# Patient Record
Sex: MALE | Race: WHITE | NOT HISPANIC OR LATINO | Employment: FULL TIME | URBAN - METROPOLITAN AREA
[De-identification: names, ages, dates, MRNs, and addresses within clinical notes are randomized per-mention and may not be internally consistent; named-entity substitution may affect disease eponyms.]

---

## 2024-01-15 ENCOUNTER — HOSPITAL ENCOUNTER (EMERGENCY)
Facility: HOSPITAL | Age: 64
Discharge: LEFT BEFORE DISCHARGE | End: 2024-01-15
Payer: COMMERCIAL

## 2024-01-15 ENCOUNTER — HOSPITAL ENCOUNTER (EMERGENCY)
Facility: HOSPITAL | Age: 64
Discharge: HOME | End: 2024-01-15
Attending: EMERGENCY MEDICINE | Admitting: EMERGENCY MEDICINE
Payer: COMMERCIAL

## 2024-01-15 VITALS
RESPIRATION RATE: 16 BRPM | HEART RATE: 62 BPM | DIASTOLIC BLOOD PRESSURE: 69 MMHG | TEMPERATURE: 98 F | SYSTOLIC BLOOD PRESSURE: 144 MMHG | OXYGEN SATURATION: 97 %

## 2024-01-15 DIAGNOSIS — R33.8 ACUTE URINARY RETENTION: ICD-10-CM

## 2024-01-15 LAB
ALBUMIN UR-MCNC: NEGATIVE MG/DL
APPEARANCE UR: CLEAR
BILIRUB UR QL STRIP: NEGATIVE
COLOR UR AUTO: ABNORMAL
GLUCOSE UR STRIP-MCNC: NEGATIVE MG/DL
HGB UR QL STRIP: ABNORMAL
HYALINE CASTS: 1 /LPF
KETONES UR STRIP-MCNC: ABNORMAL MG/DL
LEUKOCYTE ESTERASE UR QL STRIP: NEGATIVE
MUCOUS THREADS #/AREA URNS LPF: PRESENT /LPF
NITRATE UR QL: NEGATIVE
PH UR STRIP: 5 [PH] (ref 5–7)
RBC URINE: 64 /HPF
SP GR UR STRIP: 1.01 (ref 1–1.03)
UROBILINOGEN UR STRIP-MCNC: <2 MG/DL
WBC URINE: 4 /HPF

## 2024-01-15 PROCEDURE — 51798 US URINE CAPACITY MEASURE: CPT

## 2024-01-15 PROCEDURE — 99284 EMERGENCY DEPT VISIT MOD MDM: CPT

## 2024-01-15 PROCEDURE — 51702 INSERT TEMP BLADDER CATH: CPT

## 2024-01-15 PROCEDURE — 99284 EMERGENCY DEPT VISIT MOD MDM: CPT | Mod: 25

## 2024-01-15 PROCEDURE — 250N000009 HC RX 250: Performed by: EMERGENCY MEDICINE

## 2024-01-15 PROCEDURE — 81001 URINALYSIS AUTO W/SCOPE: CPT | Performed by: EMERGENCY MEDICINE

## 2024-01-15 RX ORDER — LIDOCAINE HYDROCHLORIDE 20 MG/ML
10 JELLY TOPICAL ONCE
Status: COMPLETED | OUTPATIENT
Start: 2024-01-15 | End: 2024-01-15

## 2024-01-15 RX ORDER — CIPROFLOXACIN 500 MG/1
500 TABLET, FILM COATED ORAL 2 TIMES DAILY
Qty: 14 TABLET | Refills: 0 | Status: SHIPPED | OUTPATIENT
Start: 2024-01-15 | End: 2024-01-22

## 2024-01-15 RX ADMIN — LIDOCAINE HYDROCHLORIDE 10 ML: 20 JELLY TOPICAL at 05:11

## 2024-01-15 ASSESSMENT — ACTIVITIES OF DAILY LIVING (ADL)
ADLS_ACUITY_SCORE: 33
ADLS_ACUITY_SCORE: 35

## 2024-01-15 NOTE — ED TRIAGE NOTES
"Patient seen earlier in ED and had catheter placement and has returned due to having issue with catheter \"leaking.\" Shortly into triage RN found that patient's catheter leaking due to catheter being open. Educated patient on catheter opening to draining and closing. Patient decided that he no longer wants to be seen for this issue.         "

## 2024-01-15 NOTE — Clinical Note
Dinh Mike was seen and treated in our emergency department on 1/15/2024.         Sincerely,     [facility]

## 2024-01-15 NOTE — ED TRIAGE NOTES
Pt reports 24 hours of having difficulty urinating. He states that he frequently urinates a small amount but does not feel like he is actually emptying his bladder. He reports incontinent as well.   He reports abdominal pain.

## 2024-01-15 NOTE — DISCHARGE INSTRUCTIONS
Call your regular doctor tomorrow to let them know about your ER visit and the need for Gilbert catheter placement.  Otherwise I did place a referral for you to see urology.  That appointment should be within the next 1 to 2 weeks.  In the meantime take the antibiotic to prevent infection.    You should receive a call from our schedulers within the next business day to set up the appointment with urology.    I do also recommend that you check with your insurance to find out which urology group is within your network so that you can choose the best clinic for you so that you pay the least out-of-pocket.  If you decide not to use [facility] there is also [facility] urology in town.

## 2024-01-15 NOTE — Clinical Note
Dinh Rashid was seen and treated in our emergency department on 1/15/2024.  He may return to work on 01/16/2024.       If you have any questions or concerns, please don't hesitate to call.      Shanthi Nicholson MD

## 2024-01-15 NOTE — ED PROVIDER NOTES
Emergency Department Encounter     Evaluation Date & Time:   1/15/2024  4:42 AM    CHIEF COMPLAINT:  Urinary Retention      Triage Note:Pt reports 24 hours of having difficulty urinating. He states that he frequently urinates a small amount but does not feel like he is actually emptying his bladder. He reports incontinent as well.   He reports abdominal pain.           FINAL IMPRESSION:    ICD-10-CM    1. Acute urinary retention  R33.8 Adult Urology  Referral          Impression and Plan     ED COURSE & MEDICAL DECISION MAKIN:56 AM I met with the patient to obtain patient history and performed a physical exam. Discussed plan for ED work up including potential diagnostic studies and interventions.    ED Course as of 01/15/24 0734   Mon Aren 15, 2024   0636 Patient presented with symptoms of urinary retention.  Differential includes prostate enlargement whether from the cancer, infection, or benign.  Also may be due to prostatitis or UTI.  If there was no urinary retention then would think bladder spasm likely from UTI.  If there was no retention then would also consider the possibility of a kidney stone.  However, nursing staff did do a bedside ultrasound after he tried to urinate but was unable to and there was over 600 cc of urine in his bladder.  Gilbert catheter was placed and symptoms improved.  Urinalysis shows no evidence of infection.  Will have him follow-up with urology for further cares and evaluation.  Referral sent.   0706 He had 1400 cc of dark yellow urine in his bladder.  He feels much better now that it is drained.  There is some blood in it.  Will place on antibiotic and have him follow-up with urology.  He would like a referral to urology and he will notify his [facility] ([place], [place] family physicians of the plan to follow-up with urology       At the conclusion of the encounter I discussed the results of all the tests and the disposition. The questions were answered. The patient or family  acknowledged understanding and was agreeable with the care plan.          0 minutes of critical care time        MEDICATIONS GIVEN IN THE EMERGENCY DEPARTMENT:  Medications   lidocaine (XYLOCAINE) 2 % external gel 10 mL (10 mLs Urethral $Given 1/15/24 0551)       NEW PRESCRIPTIONS STARTED AT TODAY'S ED VISIT:  New Prescriptions    CIPROFLOXACIN (CIPRO) 500 MG TABLET    Take 1 tablet (500 mg) by mouth 2 times daily for 7 days       HPI     HPI     Dinh Mike is a 63 year old male with a pertinent history of HLD who presents to this ED via self walk-in for evaluation of urinary retention.    Patient reports persistent urinary retention since yesterday. He states that he feels the urge to urinate but has been unable to. The pain goes into his back. He denies history of UTI or enlarged prostate. He denies fever. There were no other concerns/complaints at this time.    REVIEW OF SYSTEMS:  Review of Systems  remainder of systems are all otherwise negative.        Medical History     History reviewed. No pertinent past medical history.    History reviewed. No pertinent surgical history.    History reviewed. No pertinent family history.    Social History     Tobacco Use    Smoking status: Never    Smokeless tobacco: Current     Types: Chew   Substance Use Topics    Alcohol use: No    Drug use: No       ciprofloxacin (CIPRO) 500 MG tablet  SIMVASTATIN PO        Physical Exam     First Vitals:  Patient Vitals for the past 24 hrs:   BP Temp Temp src Pulse Resp SpO2   01/15/24 0441 (!) 183/118 -- -- -- -- --   01/15/24 0439 -- 98  F (36.7  C) Oral 86 16 96 %       PHYSICAL EXAM:   Constitutional:  Sitting upright and is conversable.   HENT:  Normocephalic, posterior pharynx wnl, mucous membranes pink and moist   Eyes:  PERRL, EOMI, Conjunctiva normal, No discharge, no scleral icterus.  Respiratory:  Breathing easily, clear to auscultation.  Cardiovascular:  Regular rate and rhythm. nl s1s2 0 murmurs, rubs, or gallops.   Peripheral pulses dp, pt, and radial are wnl.  No peripheral edema   GI:  Bowel sounds normal, Soft, No tenderness, No flank tenderness, nondistended.  : Gilbert catheter placed by nursing staff is draining dark yellow urine total of 1400 cc and his suprapubic area now is soft and nontender.  Musculoskeletal:  Moves all extremities.  No erythematous or swollen major joints,   Lymphatic:  No cervical lymphadenopathy  Neurologic:  Alert & oriented x 3, Normal motor function, Normal sensory function, No focal deficits noted. Normal speech.  Psychiatric:  Affect normal, Judgment normal, Mood normal.     Results     LAB AND RADIOLOGY:  All pertinent labs reviewed and interpreted  Results for orders placed or performed during the hospital encounter of 01/15/24   UA with Microscopic reflex to Culture     Status: Abnormal    Specimen: Urine, Catheter   Result Value Ref Range    Color Urine Light Yellow Colorless, Straw, Light Yellow, Yellow    Appearance Urine Clear Clear    Glucose Urine Negative Negative mg/dL    Bilirubin Urine Negative Negative    Ketones Urine Trace (A) Negative mg/dL    Specific Gravity Urine 1.015 1.001 - 1.030    Blood Urine 0.2 mg/dL (A) Negative    pH Urine 5.0 5.0 - 7.0    Protein Albumin Urine Negative Negative mg/dL    Urobilinogen Urine <2.0 <2.0 mg/dL    Nitrite Urine Negative Negative    Leukocyte Esterase Urine Negative Negative    Mucus Urine Present (A) None Seen /LPF    RBC Urine 64 (H) <=2 /HPF    WBC Urine 4 <=5 /HPF    Hyaline Casts Urine 1 <=2 /LPF    Narrative    Urine Culture not indicated         ECG:  None    PROCEDURES:  Procedures:      [facility] Documentation     Medical Decision Making  Obtained supplemental history:Supplemental history obtained?: No  Reviewed external records: External records reviewed?: No  Care impacted by chronic illness:Hyperlipidemia  Care significantly affected by social determinants of health:Access to Medical Care  Did you consider but not  order tests?: Work up considered but not performed and documented in chart, if applicable  Did you interpret images independently?: Independent interpretation of ECG and images noted in documentation, when applicable.  Consultation discussion with other provider:Did you involve another provider (consultant, , pharmacy, etc.)?: No  Discharge. I prescribed additional prescription strength medication(s) as charted. See documentation for any additional details.       The creation of this record is based on the scribe s observations of the work being performed by Melodie Wilks and the provider s statements to them. This document has been checked and approved by MD Shanthi Vásquez MD  Emergency Medicine  [facility] EMERGENCY DEPARTMENT         Shanthi Nicholson MD  01/15/24 0734

## 2024-01-23 ENCOUNTER — OFFICE VISIT (OUTPATIENT)
Dept: UROLOGY | Facility: CLINIC | Age: 64
End: 2024-01-23
Payer: COMMERCIAL

## 2024-01-23 ENCOUNTER — ALLIED HEALTH/NURSE VISIT (OUTPATIENT)
Dept: UROLOGY | Facility: CLINIC | Age: 64
End: 2024-01-23
Payer: COMMERCIAL

## 2024-01-23 VITALS
HEART RATE: 65 BPM | SYSTOLIC BLOOD PRESSURE: 142 MMHG | WEIGHT: 240 LBS | BODY MASS INDEX: 34.19 KG/M2 | RESPIRATION RATE: 18 BRPM | DIASTOLIC BLOOD PRESSURE: 82 MMHG | OXYGEN SATURATION: 98 %

## 2024-01-23 DIAGNOSIS — R33.9 URINARY RETENTION WITH INCOMPLETE BLADDER EMPTYING: Primary | ICD-10-CM

## 2024-01-23 DIAGNOSIS — R33.8 BENIGN PROSTATIC HYPERPLASIA WITH URINARY RETENTION: ICD-10-CM

## 2024-01-23 DIAGNOSIS — N40.1 BENIGN PROSTATIC HYPERPLASIA WITH URINARY RETENTION: ICD-10-CM

## 2024-01-23 PROCEDURE — 99203 OFFICE O/P NEW LOW 30 MIN: CPT | Performed by: STUDENT IN AN ORGANIZED HEALTH CARE EDUCATION/TRAINING PROGRAM

## 2024-01-23 PROCEDURE — 99207 PR NO CHARGE NURSE ONLY: CPT

## 2024-01-23 RX ORDER — TAMSULOSIN HYDROCHLORIDE 0.4 MG/1
0.4 CAPSULE ORAL EVERY EVENING
Qty: 90 CAPSULE | Refills: 1 | Status: SHIPPED | OUTPATIENT
Start: 2024-01-23 | End: 2024-02-26

## 2024-01-23 NOTE — PROGRESS NOTES
Chief Complaint:   Urinary retention         History of Present Illness:   Dinh Mike is a 63 year old male with a history of HLD who presents for evaluation of urinary retention.     The patient presented to the ED on 1/15/2024 for difficulty urinating. An indwelling catheter was placed and 1400 mL of urine was drained from the bladder.     His PSA was 4.8 ng/mL on 8/21/2023.          Past Medical History:   No past medical history on file.         Past Surgical History:   No past surgical history on file.         Medications     Current Outpatient Medications   Medication    SIMVASTATIN PO     No current facility-administered medications for this visit.            Allergies:   Penicillins         Review of Systems:  From intake questionnaire   Negative 14 system review except as noted on HPI, nurse's note.         Physical Exam:   Patient is a 63 year old  male   Vitals: There were no vitals taken for this visit.  General Appearance Adult: Alert, no acute distress, oriented.  Lungs: Non-labored breathing.  Heart: No obvious jugular venous distension present.  Neuro: Alert, oriented, speech and mentation normal      Labs and Pathology:    I personally reviewed all applicable laboratory data and went over findings with patient  Significant for:    UA RESULTS:   Recent Labs   Lab Test 01/15/24  0528   SG 1.015   URINEPH 5.0   NITRITE Negative   RBCU 64*   WBCU 4            Assessment and Plan:     Assessment: 63 year old male seen in evaluation for urinary retention. The patient presented to the ED on 1/15/2024 for difficulty urinating. An indwelling catheter was placed and 1400 mL of urine was drained from the bladder.     A TOV was performed today. 225 mL NS was instilled in the bladder. He was able to void 100 mL and 250 mL remained by bladder scan. He elected to learn CIC and 300 mL was drained with catheterization.     We discussed BPH as the likely cause of his urinary retention. He is agreeable to  starting tamsulosin as well. He will return to clinic in a month for follow up and PSA recheck.    Plan:  Start tamsulosin 0.4 mg daily.   CIC 3-4 times daily and prn.   Follow up in one month, sooner if concerns.    AKILA GARCIA PA-C  Department of Urology

## 2024-01-23 NOTE — PATIENT INSTRUCTIONS
Self-catheterization instructions:  Make an attempt to void independently about every four hours. After you have emptied your bladder to the best of your ability, insert the catheter until you get urine return. Measure the amount that is drained with the catheter and keep record of this. Start by self-catheterizing about 3-4 times/day.   If catheterized urine volumes are consistently <200 mL, you can catheterize less frequently. If catheterized volumes are consistently >400 mL, catheterize more frequently.   Stay well hydrated and avoid constipation.

## 2024-01-23 NOTE — PROGRESS NOTES
Patient presented to clinic today for trial void per Cassandra Muse PA-C     Urine in catheter bag appearing clear yellow.   Disconnected at armos tube.   Instilled 225 ml of sterile water into bladder.   Patient tolerated well. Upon patient having urge to urinate and unable to tolerate more fluid  Balloon emptied. 8 ml's removed  16 fr ramos removed with tip intact.     Patient able to urinate 100 ml.   Bladder scan revealed 289 ml post-void    Per Urology RN TOV guidelines: CIC discussed. Patient eager to learn and demonstrated technique well with 14 fr straight catheter.   300 ml removed from bladder with self catheterization.    Provider on-site for nurse visit: Cassandra Muse PA-C       Patient discharged to home.     Edna LUCERO RN  [facility]

## 2024-01-23 NOTE — NURSING NOTE
Chief Complaint   Patient presents with    Consult     ref Dr Moya for retention, TOV prior to appt with Cassandra       Vitals:    01/23/24 1134   BP: (!) 142/82   Pulse: 65   Resp: 18   SpO2: 98%   Weight: 108.9 kg (240 lb)     Wt Readings from Last 1 Encounters:   01/23/24 108.9 kg (240 lb)     Annabel Arora MA

## 2024-01-23 NOTE — LETTER
[facility] [place]  [address] [address]  [phone]      1/23/2024    Re: Dinh AUGUSTE Mike      TO WHOM IT MAY CONCERN:    Dinh Mike  was seen on 1/23/2024.  He can return to work tomorrow, 1/24/2024.     Cordially    AKILA GARCIA PA-C

## 2024-01-30 ENCOUNTER — TELEPHONE (OUTPATIENT)
Dept: UROLOGY | Facility: CLINIC | Age: 64
End: 2024-01-30
Payer: COMMERCIAL

## 2024-01-30 NOTE — TELEPHONE ENCOUNTER
[facility] Call Center    Phone Message    May a detailed message be left on voicemail: yes     Reason for Call: Other: Pt was told he would be receiving tube supplies to self catheterize at home. He has not received anything. Pt wondering if he can pick it up tomorrow at the clinic. Please call pt Wife Radha. Thank you.       Action Taken: Message routed to:  Other: uro    Travel Screening: Not Applicable

## 2024-01-31 ENCOUNTER — MEDICAL CORRESPONDENCE (OUTPATIENT)
Dept: HEALTH INFORMATION MANAGEMENT | Facility: CLINIC | Age: 64
End: 2024-01-31
Payer: COMMERCIAL

## 2024-01-31 NOTE — TELEPHONE ENCOUNTER
Patient seen on 1/23/24 with Cassandra.    Per note:    Plan:  Start tamsulosin 0.4 mg daily.   CIC 3-4 times daily and prn.   Follow up in one month, sooner if concerns.     Patient was taught CIC at nurse visit yesterday.  Not sure if additional form filled out for catheter supplies?    Sherrell Haque RN on 1/31/2024 at 8:07 AM

## 2024-01-31 NOTE — TELEPHONE ENCOUNTER
Order faxed to [address] [place] [address].   Patient and wife updated with status and offered supplies to get him through until home delivery if needed    Edna LUCERO RN  [facility]

## 2024-02-26 ENCOUNTER — OFFICE VISIT (OUTPATIENT)
Dept: UROLOGY | Facility: CLINIC | Age: 64
End: 2024-02-26
Payer: COMMERCIAL

## 2024-02-26 VITALS
OXYGEN SATURATION: 98 % | BODY MASS INDEX: 34.36 KG/M2 | SYSTOLIC BLOOD PRESSURE: 152 MMHG | TEMPERATURE: 97.9 F | WEIGHT: 240 LBS | HEIGHT: 70 IN | HEART RATE: 73 BPM | DIASTOLIC BLOOD PRESSURE: 90 MMHG

## 2024-02-26 DIAGNOSIS — R97.20 ELEVATED PROSTATE SPECIFIC ANTIGEN (PSA): Primary | ICD-10-CM

## 2024-02-26 DIAGNOSIS — N40.1 BENIGN PROSTATIC HYPERPLASIA WITH URINARY RETENTION: ICD-10-CM

## 2024-02-26 DIAGNOSIS — R33.8 BENIGN PROSTATIC HYPERPLASIA WITH URINARY RETENTION: ICD-10-CM

## 2024-02-26 LAB — PSA SERPL DL<=0.01 NG/ML-MCNC: 4.97 NG/ML (ref 0–4.5)

## 2024-02-26 PROCEDURE — 36415 COLL VENOUS BLD VENIPUNCTURE: CPT | Performed by: STUDENT IN AN ORGANIZED HEALTH CARE EDUCATION/TRAINING PROGRAM

## 2024-02-26 PROCEDURE — 99213 OFFICE O/P EST LOW 20 MIN: CPT | Mod: 25 | Performed by: STUDENT IN AN ORGANIZED HEALTH CARE EDUCATION/TRAINING PROGRAM

## 2024-02-26 PROCEDURE — 51798 US URINE CAPACITY MEASURE: CPT | Performed by: STUDENT IN AN ORGANIZED HEALTH CARE EDUCATION/TRAINING PROGRAM

## 2024-02-26 PROCEDURE — 84153 ASSAY OF PSA TOTAL: CPT | Performed by: STUDENT IN AN ORGANIZED HEALTH CARE EDUCATION/TRAINING PROGRAM

## 2024-02-26 RX ORDER — CHLORAL HYDRATE 500 MG
1 CAPSULE ORAL DAILY
COMMUNITY

## 2024-02-26 RX ORDER — TAMSULOSIN HYDROCHLORIDE 0.4 MG/1
0.4 CAPSULE ORAL EVERY EVENING
Qty: 90 CAPSULE | Refills: 3 | Status: SHIPPED | OUTPATIENT
Start: 2024-02-26

## 2024-02-26 RX ORDER — ASPIRIN 81 MG/1
81 TABLET ORAL DAILY
COMMUNITY

## 2024-02-26 RX ORDER — IBUPROFEN 200 MG
200 TABLET ORAL EVERY 4 HOURS PRN
COMMUNITY

## 2024-02-26 RX ORDER — ATORVASTATIN CALCIUM 40 MG/1
40 TABLET, FILM COATED ORAL DAILY
COMMUNITY

## 2024-02-26 ASSESSMENT — PAIN SCALES - GENERAL: PAINLEVEL: NO PAIN (0)

## 2024-02-26 NOTE — NURSING NOTE
"Initial BP (!) 152/90   Pulse 73   Temp 97.9  F (36.6  C) (Tympanic)   Ht 1.778 m (5' 10\")   Wt 108.9 kg (240 lb)   SpO2 98%   BMI 34.44 kg/m   Estimated body mass index is 34.44 kg/m  as calculated from the following:    Height as of this encounter: 1.778 m (5' 10\").    Weight as of this encounter: 108.9 kg (240 lb). .  Active order to obtain bladder scan? Yes   Name of ordering provider:  Cassandra Muse  Bladder scan preformed post void Yes.  Bladder scan reveled 24ML  Provider notified?  Yes    Annie ELENA CMA          "

## 2024-02-26 NOTE — PROGRESS NOTES
"    UROLOGY FOLLOW-UP NOTE          Chief Complaint:   Today I had the pleasure of seeing Mr. Dinh Mike in follow-up for a chief complaint of urinary retention.          Interval Update   Dihn Mike is a very pleasant 63 year old male with a history of HLD.     Brief History: Mr. Dinh Mike presented to the ED on 1/15/2024 for difficulty urinating. An indwelling catheter was placed and 1400 mL of urine was drained from the bladder. A TOV was performed on 1/23/2024. The patient was able to void 100 mL and 250 mL remained by bladder scan. He elected to learn CIC and 300 mL was drained with catheterization. He was also started on tamsulosin 0.4 mg daily.     Today's notes: He is doing well. He stopped CIC a few weeks ago. He feels his stream is stronger with tamsulosin. He denies bothersome side effects from the medication.          Physical Exam:   Patient is a 63 year old  male   Vitals: Blood pressure (!) 152/90, pulse 73, temperature 97.9  F (36.6  C), temperature source Tympanic, height 1.778 m (5' 10\"), weight 108.9 kg (240 lb), SpO2 98%.  General: Alert and oriented x 3, no acute distress.  Respiratory: Non-labored breathing.  Cardiac: Regular rate.    PVR: 24 mL        Labs and Pathology:    I personally reviewed all applicable laboratory data and went over findings with patient  Significant for:    UA RESULTS:   Recent Labs   Lab Test 01/15/24  0528   SG 1.015   URINEPH 5.0   NITRITE Negative   RBCU 64*   WBCU 4            Assessment/Plan   63 year old male seen in follow up for urinary retention. He presented to the ED on 1/15/2024 for difficulty urinating. An indwelling catheter was placed and 1400 mL of urine was drained from the bladder. A TOV was performed on 1/23/2024. The patient was able to void 100 mL and 250 mL remained by bladder scan. He elected to learn CIC and 300 mL was drained with catheterization. He was also started on tamsulosin 0.4 mg daily.     He stopped CIC a few weeks ago. " He feels his stream is stronger with tamsulosin. His PVR today was 24 mL. He will continue with tamsulosin long term.     His PSA was slightly elevated at 4.8 ng/mL on 8/21/2023. We will recheck a PSA today.    Plan:  Continue tamsulosin 0.4 mg daily.   PSA today. If PSA increases, will order prostate MRI.   Follow up in one year for tamsulosin refills.              Past Medical History:   No past medical history on file.         Past Surgical History:   No past surgical history on file.         Medications     Current Outpatient Medications   Medication    tamsulosin (FLOMAX) 0.4 MG capsule    atorvastatin (LIPITOR) 40 MG tablet    fish oil-omega-3 fatty acids 1000 MG capsule    SIMVASTATIN PO     No current facility-administered medications for this visit.            Family History:   No family history on file.         Social History:     Social History     Socioeconomic History    Marital status:      Spouse name: Not on file    Number of children: Not on file    Years of education: Not on file    Highest education level: Not on file   Occupational History    Not on file   Tobacco Use    Smoking status: Never    Smokeless tobacco: Current     Types: Chew   Substance and Sexual Activity    Alcohol use: No    Drug use: No    Sexual activity: Not on file   Other Topics Concern    Parent/sibling w/ CABG, MI or angioplasty before 65F 55M? Not Asked   Social History Narrative    Not on file     Social Determinants of Health     Financial Resource Strain: Not on file   Food Insecurity: Not on file   Transportation Needs: Not on file   Physical Activity: Not on file   Stress: Not on file   Social Connections: Not on file   Interpersonal Safety: Not on file   Housing Stability: Not on file            Allergies:   Penicillins         Review of Systems:  From intake questionnaire   Negative 14 system review except as noted on HPI, nurse's note.        AKILA GARCIA PA-C  Department of Urology

## 2024-08-19 ENCOUNTER — LAB (OUTPATIENT)
Dept: LAB | Facility: CLINIC | Age: 64
End: 2024-08-19
Payer: COMMERCIAL

## 2024-08-19 DIAGNOSIS — R97.20 ELEVATED PROSTATE SPECIFIC ANTIGEN (PSA): ICD-10-CM

## 2024-08-19 LAB — PSA SERPL DL<=0.01 NG/ML-MCNC: 4.08 NG/ML (ref 0–4.5)

## 2024-08-19 PROCEDURE — 36415 COLL VENOUS BLD VENIPUNCTURE: CPT

## 2024-08-19 PROCEDURE — 84153 ASSAY OF PSA TOTAL: CPT

## 2025-02-06 ENCOUNTER — OFFICE VISIT (OUTPATIENT)
Dept: UROLOGY | Facility: CLINIC | Age: 65
End: 2025-02-06
Payer: COMMERCIAL

## 2025-02-06 VITALS
DIASTOLIC BLOOD PRESSURE: 89 MMHG | SYSTOLIC BLOOD PRESSURE: 133 MMHG | WEIGHT: 253 LBS | TEMPERATURE: 97.8 F | OXYGEN SATURATION: 96 % | BODY MASS INDEX: 36.3 KG/M2 | HEART RATE: 77 BPM

## 2025-02-06 DIAGNOSIS — R33.8 BENIGN PROSTATIC HYPERPLASIA WITH URINARY RETENTION: ICD-10-CM

## 2025-02-06 DIAGNOSIS — N40.1 BENIGN PROSTATIC HYPERPLASIA WITH URINARY RETENTION: ICD-10-CM

## 2025-02-06 DIAGNOSIS — R97.20 ELEVATED PROSTATE SPECIFIC ANTIGEN (PSA): Primary | ICD-10-CM

## 2025-02-06 LAB — PSA SERPL DL<=0.01 NG/ML-MCNC: 4.34 NG/ML (ref 0–4.5)

## 2025-02-06 RX ORDER — TAMSULOSIN HYDROCHLORIDE 0.4 MG/1
0.8 CAPSULE ORAL EVERY EVENING
Qty: 180 CAPSULE | Refills: 3 | Status: SHIPPED | OUTPATIENT
Start: 2025-02-06

## 2025-02-06 ASSESSMENT — PAIN SCALES - GENERAL: PAINLEVEL_OUTOF10: NO PAIN (0)

## 2025-02-06 NOTE — PROGRESS NOTES
Active order to obtain bladder scan? Yes   Name of ordering provider:  Cassandra GEE  Bladder scan preformed post void yes.  Bladder scan reveled 143 ML  Provider notified?  Yes    Roxy Rich MA on 2/6/2025 at 9:58 AM

## 2025-02-06 NOTE — NURSING NOTE
"Initial /89 (BP Location: Left arm, Patient Position: Sitting, Cuff Size: Adult Large)   Pulse 77   Temp 97.8  F (36.6  C) (Tympanic)   Wt 114.8 kg (253 lb)   SpO2 96%   BMI 36.30 kg/m   Estimated body mass index is 36.3 kg/m  as calculated from the following:    Height as of 2/26/24: 1.778 m (5' 10\").    Weight as of this encounter: 114.8 kg (253 lb). .  Roxy Rich MA on 2/6/2025 at 9:55 AM    "

## 2025-02-06 NOTE — PROGRESS NOTES
"    UROLOGY FOLLOW-UP NOTE          Chief Complaint:   Today I had the pleasure of seeing Mr. Dinh Mike in follow-up for a chief complaint of LUTS         Interval Update   Dinh Mike is a very pleasant 64 year old male with a history of HLD.      Brief History: Mr. Dinh Mike has followed with urology for urinary retention. He learned CIC, but was able to discontinue this. He takes tamsulosin 0.4 mg daily.     Today's notes: He is doing well. He reports a weak stream and occasional sensation of incomplete bladder emptying. He denies dysuria and gross hematuria.          Physical Exam:   Patient is a 64 year old  male   Vitals: Blood pressure 133/89, pulse 77, temperature 97.8  F (36.6  C), temperature source Tympanic, weight 114.8 kg (253 lb), SpO2 96%.  General: Alert and oriented x 3, no acute distress.  Respiratory: Non-labored breathing.  Cardiac: Regular rate.    PVR: 143 mL (about 30 minutes post-void)        Labs and Pathology:    I personally reviewed all applicable laboratory data and went over findings with patient  Significant for:    CBC RESULTS:  No results for input(s): \"WBC\", \"HGB\", \"PLT\" in the last 59330 hours.     BMP RESULTS:  No results for input(s): \"NA\", \"POTASSIUM\", \"CHLORIDE\", \"CO2\", \"ANIONGAP\", \"GLC\", \"BUN\", \"CR\", \"GFRESTIMATED\", \"GFRESTBLACK\", \"ARIANNA\" in the last 41723 hours.    UA RESULTS:   Recent Labs   Lab Test 01/15/24  0528   SG 1.015   URINEPH 5.0   NITRITE Negative   RBCU 64*   WBCU 4       PSA RESULTS  PSA Tumor Marker   Date Value Ref Range Status   08/19/2024 4.08 0.00 - 4.50 ng/mL Final   02/26/2024 4.97 (H) 0.00 - 4.50 ng/mL Final              Assessment/Plan   64 year old male seen in follow up for LUTS. He developed urinary retention previously but was able to spontaneously void after starting tamsulosin and CIC. He no longer needs to self-catheterize.     We discussed options moving forward including continuing his tamsulosin dose, adding finasteride, or " considering BPH surgery. He would like to try increasing his tamsulosin dose.     Plan:  Increase tamsulosin to 0.8 mg daily.   PSA today. If rising, will order a prostate MRI.  Follow up in one year, sooner if concerns.              Past Medical History:   No past medical history on file.         Past Surgical History:   No past surgical history on file.         Medications     Current Outpatient Medications   Medication Sig Dispense Refill    aspirin 81 MG EC tablet Take 81 mg by mouth daily      atorvastatin (LIPITOR) 40 MG tablet Take 40 mg by mouth daily      fish oil-omega-3 fatty acids 1000 MG capsule Take 1 capsule by mouth daily      ibuprofen (ADVIL/MOTRIN) 200 MG tablet Take 200 mg by mouth every 4 hours as needed for pain      tamsulosin (FLOMAX) 0.4 MG capsule Take 1 capsule (0.4 mg) by mouth every evening 90 capsule 3     No current facility-administered medications for this visit.            Family History:   No family history on file.         Social History:     Social History     Socioeconomic History    Marital status:      Spouse name: Not on file    Number of children: Not on file    Years of education: Not on file    Highest education level: Not on file   Occupational History    Not on file   Tobacco Use    Smoking status: Never    Smokeless tobacco: Former     Types: Chew   Substance and Sexual Activity    Alcohol use: No    Drug use: No    Sexual activity: Not on file   Other Topics Concern    Parent/sibling w/ CABG, MI or angioplasty before 65F 55M? Not Asked   Social History Narrative    Not on file     Social Drivers of Health     Financial Resource Strain: Not At Risk (8/21/2023)    Received from [employer] [employer]    Financial Resource Strain     Is it hard for you to pay for the very basics like food, housing, medical care or heating?: No   Food Insecurity: Not At Risk (8/21/2023)    Received from [employer] [employer]    Food Insecurity     Does your food run  out before you have the money to buy more?: No   Transportation Needs: Not At Risk (8/21/2023)    [facility]    Transportation Needs     Does a lack of transportation keep you from your medical appointments or from getting your medications?: No   Physical Activity: Not on file   Stress: Not on file   Social Connections: Not on file   Interpersonal Safety: Not on file   Housing Stability: Not on file            Allergies:   Penicillins         Review of Systems:  From intake questionnaire   Negative 14 system review except as noted on HPI, nurse's note.        AKILA GARCIA PA-C  Department of Urology

## 2025-03-04 ENCOUNTER — TELEPHONE (OUTPATIENT)
Dept: UROLOGY | Facility: CLINIC | Age: 65
End: 2025-03-04
Payer: COMMERCIAL

## 2025-03-04 NOTE — TELEPHONE ENCOUNTER
It depends what he means by kidney issues. If his family has chronic kidney disease, that is going to be evaluated with a BMP. Urology does not typically manage CKD, only anatomic issues with the kidneys.

## 2025-03-04 NOTE — TELEPHONE ENCOUNTER
Outgoing call: LMTCMB- have questions regarding what types of issues he's concerned about. He may need to follow-up with PCP.

## 2025-03-04 NOTE — TELEPHONE ENCOUNTER
[facility]    Phone Message    May a detailed message be left on voicemail: yes     Reason for Call: Other: patient called in wondering if provider would refer him to have an US done on his kidneys? Patient states that his brother and his mother both have kidney issues. Please review and call him back to discuss.      Action Taken: Message routed to:  Other: [facility]    Travel Screening: Not Applicable     Date of Service:

## 2025-03-04 NOTE — TELEPHONE ENCOUNTER
Due to patients brothers medical diagnosis - his brothers doctor suggested patient be checked.    Patient has BP appt with PCP coming up and suggested he start there for work up in kidney function etc.  His BP medication could be related.  Maxine PAIZ   [facility]

## 2025-08-14 ENCOUNTER — OFFICE VISIT (OUTPATIENT)
Dept: UROLOGY | Facility: CLINIC | Age: 65
End: 2025-08-14
Payer: COMMERCIAL

## 2025-08-14 VITALS
HEART RATE: 68 BPM | SYSTOLIC BLOOD PRESSURE: 132 MMHG | OXYGEN SATURATION: 98 % | DIASTOLIC BLOOD PRESSURE: 85 MMHG | TEMPERATURE: 97.8 F | RESPIRATION RATE: 18 BRPM

## 2025-08-14 DIAGNOSIS — R33.8 BENIGN PROSTATIC HYPERPLASIA WITH URINARY RETENTION: Primary | ICD-10-CM

## 2025-08-14 DIAGNOSIS — N40.1 BENIGN PROSTATIC HYPERPLASIA WITH URINARY RETENTION: Primary | ICD-10-CM

## 2025-08-14 RX ORDER — TAMSULOSIN HYDROCHLORIDE 0.4 MG/1
0.8 CAPSULE ORAL EVERY EVENING
Qty: 180 CAPSULE | Refills: 3 | Status: SHIPPED | OUTPATIENT
Start: 2025-08-14